# Patient Record
Sex: FEMALE | Race: WHITE | HISPANIC OR LATINO | Employment: UNEMPLOYED | URBAN - METROPOLITAN AREA
[De-identification: names, ages, dates, MRNs, and addresses within clinical notes are randomized per-mention and may not be internally consistent; named-entity substitution may affect disease eponyms.]

---

## 2020-10-19 DIAGNOSIS — M25.562 LEFT KNEE PAIN, UNSPECIFIED CHRONICITY: Primary | ICD-10-CM

## 2020-11-02 ENCOUNTER — HOSPITAL ENCOUNTER (OUTPATIENT)
Dept: RADIOLOGY | Facility: HOSPITAL | Age: 40
Discharge: HOME | End: 2020-11-02
Attending: ORTHOPAEDIC SURGERY
Payer: COMMERCIAL

## 2020-11-02 ENCOUNTER — TELEPHONE (OUTPATIENT)
Dept: ORTHOPEDICS | Facility: CLINIC | Age: 40
End: 2020-11-02

## 2020-11-02 ENCOUNTER — OFFICE VISIT (OUTPATIENT)
Dept: ORTHOPEDICS | Facility: CLINIC | Age: 40
End: 2020-11-02
Payer: COMMERCIAL

## 2020-11-02 VITALS — SYSTOLIC BLOOD PRESSURE: 117 MMHG | DIASTOLIC BLOOD PRESSURE: 77 MMHG | HEART RATE: 77 BPM | WEIGHT: 209 LBS

## 2020-11-02 DIAGNOSIS — M76.52 PATELLAR TENDINITIS OF LEFT KNEE: Primary | ICD-10-CM
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
## 2020-11-02 DIAGNOSIS — M25.562 LEFT KNEE PAIN, UNSPECIFIED CHRONICITY: Primary | ICD-10-CM

## 2020-11-02 DIAGNOSIS — M25.562 LEFT KNEE PAIN, UNSPECIFIED CHRONICITY: ICD-10-CM

## 2020-11-02 DIAGNOSIS — S80.02XA CONTUSION OF LEFT PATELLA, INITIAL ENCOUNTER: ICD-10-CM

## 2020-11-02 DIAGNOSIS — M70.52 PATELLAR BURSITIS OF LEFT KNEE: ICD-10-CM

## 2020-11-02 PROCEDURE — 73562 X-RAY EXAM OF KNEE 3: CPT | Mod: TC,RT

## 2020-11-02 PROCEDURE — 73590 XR TIBIA FIBULA 2 VIEW LEFT: ICD-10-PCS | Mod: 26,LT,, | Performed by: RADIOLOGY

## 2020-11-02 PROCEDURE — 99203 PR OFFICE/OUTPT VISIT, NEW, LEVL III, 30-44 MIN: ICD-10-PCS | Mod: S$GLB,,, | Performed by: ORTHOPAEDIC SURGERY

## 2020-11-02 PROCEDURE — 99999 PR PBB SHADOW E&M-EST. PATIENT-LVL II: CPT | Mod: PBBFAC,,, | Performed by: ORTHOPAEDIC SURGERY

## 2020-11-02 PROCEDURE — 73562 XR KNEE ORTHO LEFT WITH FLEXION: ICD-10-PCS | Mod: 26,RT,, | Performed by: RADIOLOGY

## 2020-11-02 PROCEDURE — 73590 X-RAY EXAM OF LOWER LEG: CPT | Mod: TC,LT

## 2020-11-02 PROCEDURE — 73562 X-RAY EXAM OF KNEE 3: CPT | Mod: 26,RT,, | Performed by: RADIOLOGY

## 2020-11-02 PROCEDURE — 99203 OFFICE O/P NEW LOW 30 MIN: CPT | Mod: S$GLB,,, | Performed by: ORTHOPAEDIC SURGERY

## 2020-11-02 PROCEDURE — 73564 X-RAY EXAM KNEE 4 OR MORE: CPT | Mod: 26,LT,, | Performed by: RADIOLOGY

## 2020-11-02 PROCEDURE — 99999 PR PBB SHADOW E&M-EST. PATIENT-LVL II: ICD-10-PCS | Mod: PBBFAC,,, | Performed by: ORTHOPAEDIC SURGERY

## 2020-11-02 PROCEDURE — 73590 X-RAY EXAM OF LOWER LEG: CPT | Mod: 26,LT,, | Performed by: RADIOLOGY

## 2020-11-02 PROCEDURE — 73564 XR KNEE ORTHO LEFT WITH FLEXION: ICD-10-PCS | Mod: 26,LT,, | Performed by: RADIOLOGY

## 2020-11-02 NOTE — PROGRESS NOTES
Patient ID: Trang Saeed  YOB: 1980  MRN: [id number]    Chief Complaint: Pain of the Left Knee    Referred By:     History of Present Illness: Trang Saeed is a right-hand dominant 40 y.o. 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  Patient states that on 9/3/2020 she was throwing out the trash and fell and hit her left knee on stairs. Workplace injury. Works for [employer] .    Knee Pain   The pain is present in the left knee. The current episode started more than 1 month ago. The injury was the result of a falling action while at work. The problem occurs constantly. The problem has been unchanged. Quality: twisting. The pain is at a severity of 8/10. Associated symptoms include joint swelling. Pertinent negatives include no fever or itching. The symptoms are aggravated by walking, sitting, lying down, activity and exercise. She has tried brace/corset, NSAIDs and OTC pain meds for the symptoms. The treatment provided mild relief. Physical therapy was effective and ineffective (Tried PT - unsure of where - helps in the moment).      Past Medical History:   History reviewed. No pertinent past medical history.  Past Surgical History:   Procedure Laterality Date    PERINEAL WOUND CLOSURE       Family History   Problem Relation Age of Onset    Breast cancer Mother     Diabetes Neg Hx     Hypertension Neg Hx     Ovarian cancer Neg Hx     Miscarriages / Stillbirths Neg Hx      Social History     Socioeconomic History    Marital status:      Spouse name: Not on file    Number of children: Not on file    Years of education: Not on file    Highest education level: Not on file   Occupational History    Not on file   Social Needs    Financial resource strain: Not on file    Food insecurity     Worry: Not on file     Inability: Not on file    Transportation needs     Medical: Not on file     Non-medical:  Not on file   Tobacco Use    Smoking status: Never Smoker   Substance and Sexual Activity    Alcohol use: No    Drug use: No    Sexual activity: Yes     Partners: Male   Lifestyle    Physical activity     Days per week: Not on file     Minutes per session: Not on file    Stress: Not on file   Relationships    Social connections     Talks on phone: Not on file     Gets together: Not on file     Attends Tenriism service: Not on file     Active member of club or organization: Not on file     Attends meetings of clubs or organizations: Not on file     Relationship status: Not on file   Other Topics Concern    Not on file   Social History Narrative    Not on file       Review of patient's allergies indicates:  No Known Allergies  Review of Systems   Constitution: Negative for fever.   HENT: Negative for sore throat.    Eyes: Negative for blurred vision.   Cardiovascular: Negative for dyspnea on exertion.   Respiratory: Negative for shortness of breath.    Hematologic/Lymphatic: Does not bruise/bleed easily.   Skin: Negative for itching.   Musculoskeletal: Positive for joint pain and joint swelling.   Gastrointestinal: Negative for vomiting.   Genitourinary: Negative for dysuria.   Neurological: Negative for dizziness.   Psychiatric/Behavioral: The patient does not have insomnia.        Physical Exam:   There is no height or weight on file to calculate BMI.  Vitals:    11/02/20 1123   BP: 117/77   Pulse: 77      GENERAL: Well appearing, appropriate for stated age, no acute distress.  CARDIOVASCULAR: Pulses regular by peripheral palpation.  PULMONARY: Respirations are even and non-labored.  NEURO: Awake, alert, and oriented x 3.  PSYCH: Mood & affect are appropriate.  HEENT: Head is normocephalic and atraumatic.  Ortho/SPM Exam  Right knee: Range of motion within normal limits and painless. Intact motor and sensation. Good perfusion. No tenderness, gross deformity, gross instability, or skin lesions.  Left knee:  prepatellar swelling. No erythema. No skin changes. Compartments soft and compressible. 0-100 active and passive. 5/5 flex ext. Calf soft non tender. Stable to v/v. No gross a/p instability. Intact EHL, FHL, gastrocsoleus, and tibialis anterior. Sensation intact to light touch in superficial peroneal, deep peroneal, tibial, sural, and saphenous nerve distributions. Foot warm and well perfused with capillary refill of less than 2 seconds and palpable pedal pulses.  Gait: slightly antalgic on left side.    Imaging:    X-ray Knee Ortho Left with Flexion  Narrative: EXAMINATION:  XR KNEE ORTHO LEFT WITH FLEXION    CLINICAL HISTORY:  . Pain in left knee    TECHNIQUE:  AP standing of both knees, PA flexion standing views of both knees, and Merchant views of both knees were performed. A lateral of the left knee was also performed.    COMPARISON:  None    FINDINGS:  There is no radiographic evidence of acute osseous, articular, or soft tissue abnormality.  Joint spaces are preserved.  Impression: No acute findings    Electronically signed by: Yonis Penn MD  Date:    11/02/2020  Time:    11:30    Relevant imaging results reviewed and interpreted by me, discussed with the patient and / or family today.     Other Tests:     No other tests performed today.    Assessment:  Trang Saeed is a 40 y.o. female with left knee pain   Left knee prepatellar bursitis   Left knee patellar tendonitis   Left knee patellar contusion   Work related injury    Encounter Diagnoses   Name Primary?    Patellar tendinitis of left knee Yes    Patellar bursitis of left knee     Contusion of left patella, initial encounter     Work related injury         Plan:   Knee sleeve - wear during the day only   PT 2x/wk - quad and hip strengthening, modalities as indicated   Voltaren cream BID prn   Avoid exacerbating activities   Light duty/seated work only   Left tib-fib x-rays on way out today    Follow-up: 6 weeks or sooner  if there are any problems between now and then.    Disclaimer: This note was prepared using a voice recognition system and is likely to have sound alike errors within the text.

## 2020-11-02 NOTE — PATIENT INSTRUCTIONS
Assessment:  Trang Saeed is a 40 y.o. female with left knee pain   Left knee prepatellar bursitis   Left knee patellar tendonitis   Left knee patellar contusion   Work related injury    Encounter Diagnoses   Name Primary?    Patellar tendinitis of left knee Yes    Patellar bursitis of left knee     Contusion of left patella, initial encounter     Work related injury         Plan:   Knee sleeve - wear during the day only   PT 2x/wk - quad and hip strengthening, modalities as indicated   Voltaren cream BID prn   Avoid exacerbating activities   Light duty/seated work only   Left tib-fib x-rays on way out today    Follow-up: 6 weeks or sooner if there are any problems between now and then.    Thank you for choosing [facility] and Dr. Nicolas Zuleta for your orthopedic & sports medicine care. It is our goal to provide you with exceptional care that will help keep you healthy, active, and get you back in the game.    If you felt that you received exemplary care today, please consider leaving us feedback on [employer] at [email or web address].     Please do not hesitate to reach out to us via email, phone, or MyChart with any questions, concerns, or feedback.    If you are experiencing pain/discomfort ,or have questions after 5pm and would like to be connected to the [facility] on-call team, please call this number and specify which Sports Medicine provider is treating you: [phone]

## 2020-11-16 ENCOUNTER — CLINICAL SUPPORT (OUTPATIENT)
Dept: REHABILITATION | Facility: HOSPITAL | Age: 40
End: 2020-11-16
Attending: ORTHOPAEDIC SURGERY
Payer: COMMERCIAL

## 2020-11-16 DIAGNOSIS — M76.52 PATELLAR TENDINITIS OF LEFT KNEE: ICD-10-CM

## 2020-11-16 DIAGNOSIS — M25.562 ACUTE PAIN OF LEFT KNEE: ICD-10-CM

## 2020-11-16 PROCEDURE — 97110 THERAPEUTIC EXERCISES: CPT

## 2020-11-16 PROCEDURE — 97161 PT EVAL LOW COMPLEX 20 MIN: CPT

## 2020-11-16 NOTE — PLAN OF CARE
[facility]  Physical Therapy Initial Evaluation    Name: Trang Saeed  Clinic Number: [id number]    Therapy Diagnosis:   Encounter Diagnosis   Name Primary?    Patellar tendinitis of left knee      Physician: Nicolas Zuleta MD    Physician Orders: PT Eval and Treat   Medical Diagnosis from Referral: Patellar tendinitis of left knee  Evaluation Date: 11/16/2020  Authorization Period Expiration: 11/02/2021  Plan of Care Expiration: 12/28/2020  Visit # / Visits authorized: 1/ 12    Time In: 11:20 am   Time Out: 12:20 pm   Total Appointment Time (timed & untimed codes): 20 minutes    Precautions: Standard    Subjective   Date of injury: September 3rd  History of current condition - Trang reports:Via an English to [place] interpretor over the phone. On September the 3rd [id number] she fell at work on to her left knee while going up the stairs She works as a  at a [employer]. Was prescribed pain medication by her MD, that only helps a little. Pain is worse in the morning up to a 10/10. Her pain is made worse with static standing, sitting and active movements. Her pain is described as sharp that is intermittent. Her pain is alleviated when laying down, medication, or when her  performs passive range of motion movements.     Occupation (including job description if provided):  at a [employer].  Job Demands: Cleaning.Put away boxes, put away garbage. Put away food. Sweep the building, sweep the parking lot. Up and down stairs while carrying various items that weight around 20 lbs.     Prior Medical Treatment: Medication and Imaging  Current Work Restrictions: Unable to work.     Medical History:   No past medical history on file.    Surgical History:   Trang Saeed  has a past surgical history that includes Perineal wound closure.    Medications:   Trang currently has no medications in their medication list.    Allergies: none      Imaging, X-rays: Left knee, left tibia fibula: No acute findings    Social History:  lives with their family    Pain:  Current 8/10, worst 10/10, best 5/10   Location: Left anterior knee  down to anterior shin.   Description: Sharp  Aggravating Factors: Stairs, standing up, Sitting.   Alleviating Factors: medication, laying down, Passive range of motion    Pt's goals: Return to gainful employment         Decrease pain.     Objective     Gait: Slow, labored. Increased LETY. Decreased stride length on L.     Squat: Double leg: unable to squat past 20 degrees of knee flexion.    Single leg: unable to test due to balance    Balance: eyes open SL balance on R: 5 seconds on L: 0 seconds.     Reflex/Sensation: Intact    Knee AROM:     (L) (R)     Flexion   124 WNL     Extension  8 WNL  Knee PROM  Flexion   128 WNL     Extension  5 p! WNL       Strength:  Hip flexors  4-/5 4/5  Quadriceps  3+/5 p! 4/5      Hamstrings  3+/5 4/5     Anterior Tibialis 4/5 4/5     Peroneals  4/5 4/5     Gastrocnemius 3+/5 3+/5     Adductors  4/5 4+/5     External rotators 3+/5 4/5     Gluteus Medius 3+/5 3+/5     Gluteus Pb 3+/5 4-/5    Joint Mobility: Slightly hypermobile tibiofemoral joint L>R.     Muscle Length:  Hamstrings: normal lenght     Bart: limited hip flexor length L>R  Tenderness to palpation: Highly tender to palpation along joint line of L knee    Special Test:  Anterior Drawer Painful  Posterior Drawer Painful     Lachman  Painful      Valgus stress  Painful Varus stress  Negative     hSerry  painful     Apley Compress Negative Apley Distract  Negative       Lower Limb Tension Test:  negative      Functional Job Specific Testing:     Job Specific Task Job Demands Current Ability Deficit? (Yes or No)   1. Moving items Climbing stairs Unable to  Yes   2. Sweeping Standing endurance Unable to stand > 2 mins w/o pain Yes   3. Carrying boxes Lifting and carrying up to 20# items Unable Yes   (Testing should not be performed  beyond that of the patient's job demands.)         TREATMENT   Treatment Time In: 12:00 pm  Treatment Time Out: 12:20 pm   Total Treatment time (time-based codes) separate from Evaluation: 20 minutes    Trang received therapeutic exercises to develop strength, endurance and ROM for 20 minutes including:  Quad sets 5' isometric holds 2 x 10   Hamstring sets 5' isometric holds 2 x 10   Seated calf raises 2 x 10  Seated toe raises 2 x 10  Clam shells 1 x 10      Home Exercises and Patient Education Provided    Education provided: Yes  - HEP, nature of symptoms, POC    Written Home Exercises Provided: yes.  Exercises were reviewed and Trang was able to demonstrate them prior to the end of the session.  Trang demonstrated good  understanding of the education provided.     See EMR under Patient Instructions for exercises provided 11/16/2020.    Assessment   Trang is a 40 y.o. female referred to outpatient Physical Therapy with a medical diagnosis of Patellar tendinitis of left knee. Pt presents with impaired LE range of motion, strength, gait, standing tolerance, and pain up to a 10/10 limiting her ability to perform ADLs and perform her work related duties. She presents somewhat consistently with the medical diagnosis of Patellar tendinitis but with patella bursitis and tibiofemoral joint laxity noted.   The patient's current job specific task deficits include the following: Unable to lift and mack boxes, ascend and descend box, and cleaning (sweeping).    Pt prognosis is Good.     Skilled Physical Therapy intervention is required at this time for the injured worker to address the musculoskeletal limitations and work-related functional deficits for their job as a .    Plan of care discussed with patient: Yes  Pt's spiritual, cultural and educational needs considered and patient is agreeable to the plan of care and goals as stated below:     Anticipated Barriers for therapy: language    Medical Necessity is  demonstrated by the following  History  Co-morbidities and personal factors that may impact the plan of care Co-morbidities:   high BMI    Personal Factors:   education level     low   Examination  Body Structures and Functions, activity limitations and participation restrictions that may impact the plan of care Body Regions:   lower extremities    Body Systems:    ROM  strength  gross coordinated movement  balance  gait  motor control  motor learning    Participation Restrictions:   Work duties    Activity limitations:   Learning and applying knowledge  watching  listening    General Tasks and Commands  no deficits    Communication  communicating with/receiving spoken language  communicating with/receiving non-verbal language    Mobility  lifting and carrying objects  walking    Self care  no deficits    Domestic Life  shopping  cooking  doing house work (cleaning house, washing dishes, laundry)    Interactions/Relationships  no deficits    Life Areas  informal education  employment    Community and Social Life  community life  recreation and leisure         Moderate   Clinical Presentation stable and uncomplicated low   Decision Making/ Complexity Score: low       Goals:     Short Term Goals: In 4 weeks:  1.I with HEP  2.Patient to demo increased AROM /PROM  to 90% WNLs  3.Patient to demo increase LE strength  to 4/5 grossly.   4.Patient to have decreased pain to 5/10 at all times.    Long Term Goals: In 6 weeks  1. Patient to perform daily activities including Work duties without limitation.  2. Patient to demonstrate increased knee AROM/PROM to 100% WNLs.  3. Patient to demonstrate increased LE strength to 4+/5 grossly.  4. Patient to have decreased pain to 3/10.    Pt will return to work full duty, full time.    Plan   Plan of care Certification: 11/16/2020 to 12/28/2020.    Outpatient Physical Therapy 2 times weekly for 6 weeks to include the following interventions: Gait Training, Manual Therapy, Moist Heat/  Ice, Neuromuscular Re-ed, Patient Education, Therapeutic Activites and Therapeutic Exercise.     Upon discharge from further skilled PT intervention it will determined if the need for a work conditioning program or Functional Capacity Evaluation is required to allow the injured worker to return to work with full potential achieved, continued improvement with body mechanics with advanced functional activities, and further prevention of future work-related injuries.     Shaun Hernández, [facility]  11/16/2020

## 2020-11-17 PROBLEM — M25.562 ACUTE PAIN OF LEFT KNEE: Status: ACTIVE | Noted: 2020-11-17

## 2020-11-27 ENCOUNTER — CLINICAL SUPPORT (OUTPATIENT)
Dept: REHABILITATION | Facility: HOSPITAL | Age: 40
End: 2020-11-27
Payer: COMMERCIAL

## 2020-11-27 DIAGNOSIS — M25.562 ACUTE PAIN OF LEFT KNEE: ICD-10-CM

## 2020-11-27 PROCEDURE — 97140 MANUAL THERAPY 1/> REGIONS: CPT

## 2020-11-27 PROCEDURE — 97110 THERAPEUTIC EXERCISES: CPT

## 2020-11-27 NOTE — PROGRESS NOTES
Physical Therapy Treatment Note     Name: Trang Saeed  Clinic Number: [id number]    Therapy Diagnosis:   Encounter Diagnosis   Name Primary?    Acute pain of left knee      Physician: Nicolas Zuleta MD    Visit Date: 11/27/2020    Physician Orders: PT Eval and Treat   Medical Diagnosis from Referral: Patellar tendinitis of left knee  Evaluation Date: 11/16/2020  Authorization Period Expiration: 11/02/2021  Plan of Care Expiration: 12/28/2020  Visit # / Visits authorized: 2/ 12      Time In: 11:23 am   Time Out 12:15 pm   Total Billable Time: 37 minutes    Precautions: Standard    Subjective     Pt reports: Her pain levels are the same. Her pain only decreases when performing the exercises. When she walks it hurts, and her knee feels like its going to give out.   She was compliant with home exercise program.  Response to previous treatment: good  Functional change: None    Pain: 8/10 pre treatment. 5/10 post treatment.   Location: left knee      Objective     Trang received therapeutic exercises to develop strength, endurance, ROM and flexibility for 25 minutes including:  Quad sets 5' isometric holds 2 x 10   Hamstring sets 5' isometric holds 2 x 10   Seated calf raises 2 x 10  Seated toe raises 2 x 10  Hip Adduction Isometrics  Glute sets   Clam shells 1 x 10- caused pain       Trang received the following manual therapy techniques: Joint mobilizations and Soft tissue Mobilization were applied to the: L knee for 12 minutes, including:  Tibiofemoral distractions grades I-II   STM medial knee  Patellofemoral cephalic and caudual mobilizations grades I-III    Trang received cold pack for 15 minutes to L knee.      Home Exercises Provided and Patient Education Provided     Education provided:   - POC, nature of symptoms,     Written Home Exercises Provided: yes.  Exercises were reviewed and Trang was able to demonstrate them prior to the end of the session.  Trang demonstrated fair   understanding of the education provided.     See EMR under N/A for exercises provided 11/27/2020.    Assessment   Trang returns to Pt with no acute changes in her presentation. She continues to present with hypersensitivity to touch and high irritability levels. She continues to guard passive LE movement when performed by the therapist. She tolerated exercises well today, but clamshells increased her pain levels and so did manual therapy occasionally. Her pain levels dropped from an 8/10 to 5/10 by the end of the session.     Trang is progressing well towards her goals.   Pt prognosis is Fair.     Pt will continue to benefit from skilled outpatient physical therapy to address the deficits listed in the problem list box on initial evaluation, provide pt/family education and to maximize pt's level of independence in the home and community environment.     Pt's spiritual, cultural and educational needs considered and pt agreeable to plan of care and goals.     Anticipated barriers to physical therapy: Language    Goals:   Short Term Goals: In 4 weeks:  1.I with HEP- not met  2.Patient to demo increased AROM /PROM  to 90% WNLs not met  3.Patient to demo increase LE strength  to 4/5 grossly.  not met  4.Patient to have decreased pain to 5/10 at all times. not met     Long Term Goals: In 6 weeks  1. Patient to perform daily activities including Work duties without limitation. not met  2. Patient to demonstrate increased knee AROM/PROM to 100% WNLs. not met  3. Patient to demonstrate increased LE strength to 4+/5 grossly. not met  4. Patient to have decreased pain to 3/10. not met    Plan     Plan of care Certification: 11/16/2020 to 12/28/2020.     Outpatient Physical Therapy 2 times weekly for 6 weeks to include the following interventions: Gait Training, Manual Therapy, Moist Heat/ Ice, Neuromuscular Re-ed, Patient Education, Therapeutic Activites and Therapeutic Exercise.      Upon discharge from further skilled PT  intervention it will determined if the need for a work conditioning program or Functional Capacity Evaluation is required to allow the injured worker to return to work with full potential achieved, continued improvement with body mechanics with advanced functional activities, and further prevention of future work-related injuries.          Shaun Hernández, SPT

## 2020-11-30 ENCOUNTER — CLINICAL SUPPORT (OUTPATIENT)
Dept: REHABILITATION | Facility: HOSPITAL | Age: 40
End: 2020-11-30
Payer: COMMERCIAL

## 2020-11-30 DIAGNOSIS — M25.562 ACUTE PAIN OF LEFT KNEE: ICD-10-CM

## 2020-11-30 PROCEDURE — 97140 MANUAL THERAPY 1/> REGIONS: CPT

## 2020-11-30 PROCEDURE — 97110 THERAPEUTIC EXERCISES: CPT

## 2020-11-30 NOTE — PROGRESS NOTES
Physical Therapy Treatment Note     Name: Trang Saeed  Clinic Number: [id number]    Therapy Diagnosis:   Encounter Diagnosis   Name Primary?    Acute pain of left knee      Physician: Nicolas Zuleta MD    Visit Date: 11/30/2020    Physician Orders: PT Eval and Treat   Medical Diagnosis from Referral: Patellar tendinitis of left knee  Evaluation Date: 11/16/2020  Authorization Period Expiration: 11/02/2021  Plan of Care Expiration: 12/28/2020  Visit # / Visits authorized: 3/ 12      Time In: 01:35 pm   Time Out 2:30 pm   Total Billable Time: 45 minutes    Precautions: Standard    Subjective     Pt reports: Via interpretor- Sometimes it feels good but sometimes it still hurts. The pain travels down her anterior shin and is worse when.   She was compliant with home exercise program.  Response to previous treatment: good  Functional change: None    Pain: 6/10 pre treatment. 5/10 post treatment.   Location: left knee      Objective     Trang received therapeutic exercises to develop strength, endurance, ROM and flexibility for 35 minutes including:  Quad sets 5' isometric holds 2 x 10   SAQ w/ 3 second hold 2x 20   SLRs  X 10   Hip Adduction Isometrics  Glute sets   Heel slides x 2 mins  Shuttle 2 x 1 min 3B  Hamstring sets 5' isometric holds 2 x 10   Bridges 2 x 10   Step ups 2 x 10     Not  Today:  Seated calf raises 2 x 10  Seated toe raises 2 x 10  Clam shells 1 x 10- caused pain       Trang received the following manual therapy techniques: Joint mobilizations and Soft tissue Mobilization were applied to the: L knee for 10 minutes, including:  Tibiofemoral distractions grades I-II   STM medial knee  Patellofemoral cephalic and caudual mobilizations grades I-III    Trang received cold pack for 10 minutes to L knee.      Home Exercises Provided and Patient Education Provided     Education provided:   - POC, nature of symptoms,     Written Home Exercises Provided: yes.  Exercises were reviewed and  Trang was able to demonstrate them prior to the end of the session.  Trang demonstrated fair  understanding of the education provided.     See EMR under N/A for exercises provided 11/27/2020.    Assessment   Trang returns to Pt with reports of slightly decreased pain levels and decreased frequency of pain. She continues to present with hypersensitivity to touch. Her guarding during passive motion has decreased noticeably. She tolerated exercises well today, but step ups were painful.     Trang is progressing well towards her goals.   Pt prognosis is Fair.     Pt will continue to benefit from skilled outpatient physical therapy to address the deficits listed in the problem list box on initial evaluation, provide pt/family education and to maximize pt's level of independence in the home and community environment.     Pt's spiritual, cultural and educational needs considered and pt agreeable to plan of care and goals.     Anticipated barriers to physical therapy: Language    Goals:   Short Term Goals: In 4 weeks:  1.I with HEP- not met  2.Patient to demo increased AROM /PROM  to 90% WNLs not met  3.Patient to demo increase LE strength  to 4/5 grossly.  not met  4.Patient to have decreased pain to 5/10 at all times. not met     Long Term Goals: In 6 weeks  1. Patient to perform daily activities including Work duties without limitation. not met  2. Patient to demonstrate increased knee AROM/PROM to 100% WNLs. not met  3. Patient to demonstrate increased LE strength to 4+/5 grossly. not met  4. Patient to have decreased pain to 3/10. not met    Plan     Plan of care Certification: 11/16/2020 to 12/28/2020.     Outpatient Physical Therapy 2 times weekly for 6 weeks to include the following interventions: Gait Training, Manual Therapy, Moist Heat/ Ice, Neuromuscular Re-ed, Patient Education, Therapeutic Activites and Therapeutic Exercise.      Upon discharge from further skilled PT intervention it will determined if the  need for a work conditioning program or Functional Capacity Evaluation is required to allow the injured worker to return to work with full potential achieved, continued improvement with body mechanics with advanced functional activities, and further prevention of future work-related injuries.        Shaun Gagnon, REJI Nuñez, PT

## 2020-12-02 ENCOUNTER — CLINICAL SUPPORT (OUTPATIENT)
Dept: REHABILITATION | Facility: HOSPITAL | Age: 40
End: 2020-12-02
Payer: COMMERCIAL

## 2020-12-02 DIAGNOSIS — M25.562 ACUTE PAIN OF LEFT KNEE: ICD-10-CM

## 2020-12-02 PROCEDURE — 97140 MANUAL THERAPY 1/> REGIONS: CPT

## 2020-12-02 PROCEDURE — 97110 THERAPEUTIC EXERCISES: CPT

## 2020-12-02 NOTE — PROGRESS NOTES
Physical Therapy Treatment Note     Name: Trang Saeed  Clinic Number: [id number]    Therapy Diagnosis:   Encounter Diagnosis   Name Primary?    Acute pain of left knee      Physician: Nicolas Zuleta MD    Visit Date: 12/2/2020    Physician Orders: PT Eval and Treat   Medical Diagnosis from Referral: Patellar tendinitis of left knee  Evaluation Date: 11/16/2020  Authorization Period Expiration: 11/02/2021  Plan of Care Expiration: 12/28/2020  Visit # / Visits authorized: 4/ 12      Time In: 01:45 pm   Time Out  02:30 pm pm   Total Billable Time: 45 minutes    Precautions: Standard    Subjective     Pt reports: Via interpretor- Her knee is starting to feel better but it hurts while doing the home exercises and when going up stairs.   She was compliant with home exercise program.  Response to previous treatment: good  Functional change: None    Pain: 6/10 pre treatment. 4/10 post treatment.   Location: left knee      Objective     Trang received therapeutic exercises to develop strength, endurance, ROM and flexibility for 30 minutes including:  Nu-step x7 mins  Quad sets 5' isometric holds 2 x 10   SAQ w/ 3 second hold 2x 20   SLRs  X 10   Hip Adduction Isometrics  Glute sets   Isometric 45 deg extension holds 2/ 2# weight. 45 seconds hold, 30 second rest.   Hamstring sets 5' isometric holds 2 x 10   Bridges 2 x 10     Not  Today:  Heel slides x 2 mins  Seated calf raises 2 x 10  Seated toe raises 2 x 10  Clam shells 1 x 10- caused pain   Shuttle 2 x 1 min 3B  Step ups 2 x 10        Trang received the following manual therapy techniques: Joint mobilizations and Soft tissue Mobilization were applied to the: L knee for 15 minutes, including:  Tibiofemoral distractions grades I-II   STM medial knee  Patellofemoral caudual mobilizations grades I-III    Trang received cold pack for 0 minutes to L knee.    Trang received Catalan taping on her L knee to promote knee stability and shift lateral tilt  of her patella.     Home Exercises Provided and Patient Education Provided     Education provided:   - POC, nature of symptoms,     Written Home Exercises Provided: yes.  Exercises were reviewed and Trang was able to demonstrate them prior to the end of the session.  Trang demonstrated fair  understanding of the education provided.     See EMR under N/A for exercises provided 11/27/2020.    Assessment   Trang returns to Pt with reports of slightly decreased pain levels and decreased frequency of pain. No longer shows any guarding of passive movements. She tolerated exercises well today, but she still has intermittent pain with movement. The Catalan Taping seemed to provide increased knee stability while walking.     Trang is progressing well towards her goals.   Pt prognosis is Fair.     Pt will continue to benefit from skilled outpatient physical therapy to address the deficits listed in the problem list box on initial evaluation, provide pt/family education and to maximize pt's level of independence in the home and community environment.     Pt's spiritual, cultural and educational needs considered and pt agreeable to plan of care and goals.     Anticipated barriers to physical therapy: Language    Goals:   Short Term Goals: In 4 weeks:  1.I with HEP- not met  2.Patient to demo increased AROM /PROM  to 90% WNLs not met  3.Patient to demo increase LE strength  to 4/5 grossly.  not met  4.Patient to have decreased pain to 5/10 at all times. not met     Long Term Goals: In 6 weeks  1. Patient to perform daily activities including Work duties without limitation. not met  2. Patient to demonstrate increased knee AROM/PROM to 100% WNLs. not met  3. Patient to demonstrate increased LE strength to 4+/5 grossly. not met  4. Patient to have decreased pain to 3/10. not met    Plan     Plan of care Certification: 11/16/2020 to 12/28/2020.     Outpatient Physical Therapy 2 times weekly for 6 weeks to include the  following interventions: Gait Training, Manual Therapy, Moist Heat/ Ice, Neuromuscular Re-ed, Patient Education, Therapeutic Activites and Therapeutic Exercise.      Upon discharge from further skilled PT intervention it will determined if the need for a work conditioning program or Functional Capacity Evaluation is required to allow the injured worker to return to work with full potential achieved, continued improvement with body mechanics with advanced functional activities, and further prevention of future work-related injuries.        Shaun Gagnon, SPT  Sangeetha Nuñez, PT

## 2020-12-07 ENCOUNTER — CLINICAL SUPPORT (OUTPATIENT)
Dept: REHABILITATION | Facility: HOSPITAL | Age: 40
End: 2020-12-07
Payer: COMMERCIAL

## 2020-12-07 DIAGNOSIS — M25.562 ACUTE PAIN OF LEFT KNEE: ICD-10-CM

## 2020-12-07 PROCEDURE — 97110 THERAPEUTIC EXERCISES: CPT | Mod: CQ

## 2020-12-07 NOTE — PROGRESS NOTES
Physical Therapy Assistant Treatment Note     Name: Trang Saeed  Clinic Number: [id number]    Therapy Diagnosis:   Encounter Diagnosis   Name Primary?    Acute pain of left knee      Physician: Nicolas Zuleta MD    Visit Date: 12/7/2020    Physician Orders: PT Eval and Treat   Medical Diagnosis from Referral: Patellar tendinitis of left knee  Evaluation Date: 11/16/2020  Authorization Period Expiration: 11/02/2021  Plan of Care Expiration: 12/28/2020  Visit # / Visits authorized: 5/ 12  PTA Visit 1    Time In: 01:35 pm   Time Out  02:20 pm   Total Billable Time: 45 minutes    Precautions: Standard    Subjective     Pt reports: Via interpretor- Therapy has been helping her knee. She hurts while doing exercises but feels better after. She continues to hurt with stairs and prolonged standing.  She was compliant with home exercise program.  Response to previous treatment: good   Functional change: pain relief    Pain: 5/10 pre treatment. 4/10 post treatment.   Location: left knee      Objective     Trang received therapeutic exercises to develop strength, endurance, ROM and flexibility for 30 minutes including:    Squats with UE support 2x10  Standing glut med 2x10  Quad sets 5' isometric holds 2 x 10   SAQ w/ 3 second hold 2x 20 2#  SLRs  X 5  Hip Adduction Isometrics  Isometric 45 deg extension holds 2/ 2# weight. 45 seconds hold, 30 second rest.   Bridges 2 x 10   Heel slides x 2 mins    Not  Today:  Seated calf raises 2 x 10  Seated toe raises 2 x 10  Clam shells 1 x 10- caused pain   Shuttle 2 x 1 min 3B  Step ups 2 x 10        Trang received the following manual therapy techniques: Joint mobilizations and Soft tissue Mobilization were applied to the: L knee for 10 minutes, including:  Tibiofemoral distractions grades I-II   STM medial knee  Patellofemoral caudual mobilizations grades I-III    Trang received cold pack for 10 minutes to L knee.    Trang received Alayna taping on her L knee  to promote knee stability and shift lateral tilt of her patella.     Home Exercises Provided and Patient Education Provided     Education provided:   - POC, nature of symptoms    Written Home Exercises Provided: yes.  Exercises were reviewed and Trang was able to demonstrate them prior to the end of the session.  Trang demonstrated fair  understanding of the education provided.     See EMR under N/A for exercises provided 11/27/2020.    Assessment   Trang presents to therapy with increased pain in L knee worsening with stairs and SLR. Demonstrates improvement in symptoms by ability to perform additional weight bearing activities and good squat depth with UE support.    Trang is progressing well towards her goals.   Pt prognosis is Fair.     Pt will continue to benefit from skilled outpatient physical therapy to address the deficits listed in the problem list box on initial evaluation, provide pt/family education and to maximize pt's level of independence in the home and community environment.     Pt's spiritual, cultural and educational needs considered and pt agreeable to plan of care and goals.     Anticipated barriers to physical therapy: Language    Goals:    Short Term Goals: In 4 weeks:  1.I with HEP- not met  2.Patient to demo increased AROM /PROM  to 90% WNLs not met  3.Patient to demo increase LE strength  to 4/5 grossly.  not met  4.Patient to have decreased pain to 5/10 at all times. not met     Long Term Goals: In 6 weeks  1. Patient to perform daily activities including Work duties without limitation. not met  2. Patient to demonstrate increased knee AROM/PROM to 100% WNLs. not met  3. Patient to demonstrate increased LE strength to 4+/5 grossly. not met  4. Patient to have decreased pain to 3/10. not met    Plan     Plan of care Certification: 11/16/2020 to 12/28/2020.     Outpatient Physical Therapy 2 times weekly for 6 weeks to include the following interventions: Gait Training, Manual Therapy,  Moist Heat/ Ice, Neuromuscular Re-ed, Patient Education, Therapeutic Activites and Therapeutic Exercise.      Upon discharge from further skilled PT intervention it will determined if the need for a work conditioning program or Functional Capacity Evaluation is required to allow the injured worker to return to work with full potential achieved, continued improvement with body mechanics with advanced functional activities, and further prevention of future work-related injuries.      Jaquelin Dominguez, PTA

## 2020-12-08 ENCOUNTER — DOCUMENTATION ONLY (OUTPATIENT)
Dept: REHABILITATION | Facility: HOSPITAL | Age: 40
End: 2020-12-08

## 2020-12-08 NOTE — PROGRESS NOTES
PT/PTA met face to face to discuss pt's treatment plan and progress towards established goals. Pt will be seen by a physical therapist minimally every 6th visit or every 30 days.    Jaquelin Dominguez PTA

## 2020-12-09 ENCOUNTER — CLINICAL SUPPORT (OUTPATIENT)
Dept: REHABILITATION | Facility: HOSPITAL | Age: 40
End: 2020-12-09
Payer: COMMERCIAL

## 2020-12-09 DIAGNOSIS — M25.562 ACUTE PAIN OF LEFT KNEE: ICD-10-CM

## 2020-12-09 PROCEDURE — 97140 MANUAL THERAPY 1/> REGIONS: CPT

## 2020-12-09 PROCEDURE — 97110 THERAPEUTIC EXERCISES: CPT

## 2020-12-09 NOTE — PROGRESS NOTES
Physical Therapy Treatment Note     Name: Trang Springeranza  Clinic Number: [id number]    Therapy Diagnosis:   Encounter Diagnosis   Name Primary?    Acute pain of left knee      Physician: Nicolas Zuleta MD    Visit Date: 12/9/2020    Physician Orders: PT Eval and Treat   Medical Diagnosis from Referral: Patellar tendinitis of left knee  Evaluation Date: 11/16/2020  Authorization Period Expiration: 11/02/2021  Plan of Care Expiration: 12/28/2020  Visit # / Visits authorized: 6/ 12    Time In: 01:35 pm   Time Out  02:20 pm   Total Billable Time: 45 minutes    Precautions: Standard    Subjective     Pt reports: that she is feeling a little better. Still having the most difficulty with walking and stairs.   She was compliant with home exercise program.  Response to previous treatment: good   Functional change: pain relief    Pain: 5/10 pre treatment. 3/10 post treatment.   Location: left knee      Objective     Trang received therapeutic exercises to develop strength, endurance, ROM and flexibility for 35 minutes including:    Nustep x 5 min for joint nutrition  Shuttle 4 bands x 3 min  Standing heel raises  Standing glut med against TB  SLR  Sidelying hip AB  Prone hamstring curl- painful  Prone hip EX  Bridge- painful  Sit<>stands    Trang received the following manual therapy techniques: Joint mobilizations and Soft tissue Mobilization were applied to the: L knee for 10 minutes, including:  Tibiofemoral distractions grades I-II   STM medial knee  Patellofemoral caudual mobilizations grades I-III    Trang received cold pack for 10 minutes to L knee.    Home Exercises Provided and Patient Education Provided     Education provided:   - POC, nature of symptoms    Written Home Exercises Provided: yes.  Exercises were reviewed and Trang was able to demonstrate them prior to the end of the session.  Trang demonstrated fair  understanding of the education provided.     See EMR under N/A for exercises  provided 11/27/2020.    Assessment   Trang demonstrated improvement in tolerance to standing activities this session. Still having difficulty with bending knee and performing stairs without increase in symptoms.    Trang is progressing well towards her goals.   Pt prognosis is Fair.     Pt will continue to benefit from skilled outpatient physical therapy to address the deficits listed in the problem list box on initial evaluation, provide pt/family education and to maximize pt's level of independence in the home and community environment.     Pt's spiritual, cultural and educational needs considered and pt agreeable to plan of care and goals.     Anticipated barriers to physical therapy: Language    Goals:    Short Term Goals: In 4 weeks:  1.I with HEP- not met  2.Patient to demo increased AROM /PROM  to 90% WNLs not met  3.Patient to demo increase LE strength  to 4/5 grossly.  not met  4.Patient to have decreased pain to 5/10 at all times. not met     Long Term Goals: In 6 weeks  1. Patient to perform daily activities including Work duties without limitation. not met  2. Patient to demonstrate increased knee AROM/PROM to 100% WNLs. not met  3. Patient to demonstrate increased LE strength to 4+/5 grossly. not met  4. Patient to have decreased pain to 3/10. not met    Plan     Plan of care Certification: 11/16/2020 to 12/28/2020.     Outpatient Physical Therapy 2 times weekly for 6 weeks to include the following interventions: Gait Training, Manual Therapy, Moist Heat/ Ice, Neuromuscular Re-ed, Patient Education, Therapeutic Activites and Therapeutic Exercise.      Upon discharge from further skilled PT intervention it will determined if the need for a work conditioning program or Functional Capacity Evaluation is required to allow the injured worker to return to work with full potential achieved, continued improvement with body mechanics with advanced functional activities, and further prevention of future  work-related injuries.      Sangeetha Nuñez, PT

## 2020-12-14 ENCOUNTER — CLINICAL SUPPORT (OUTPATIENT)
Dept: REHABILITATION | Facility: HOSPITAL | Age: 40
End: 2020-12-14
Payer: COMMERCIAL

## 2020-12-14 DIAGNOSIS — M25.562 ACUTE PAIN OF LEFT KNEE: ICD-10-CM

## 2020-12-14 PROCEDURE — 97110 THERAPEUTIC EXERCISES: CPT

## 2020-12-14 NOTE — PROGRESS NOTES
Physical Therapy Treatment Note     Name: Trang Springeranza  Clinic Number: [id number]    Therapy Diagnosis:   Encounter Diagnosis   Name Primary?    Acute pain of left knee      Physician: Nicolas Zuleta MD    Visit Date: 12/14/2020    Physician Orders: PT Eval and Treat   Medical Diagnosis from Referral: Patellar tendinitis of left knee  Evaluation Date: 11/16/2020  Authorization Period Expiration: 11/02/2021  Plan of Care Expiration: 12/28/2020  Visit # / Visits authorized: 7/ 12    Time In: 01:35 pm   Time Out  02:30 pm   Total Billable Time: 45 minutes    Precautions: Standard    Subjective     Pt reports:  That she had some pain with cleaning yesterday. Overall thinks her knee is getting better.  She was compliant with home exercise program.  Response to previous treatment: good   Functional change: pain relief    Pain: 5/10 pre treatment. 3/10 post treatment.   Location: left knee      Objective     Trang received therapeutic exercises to develop strength, endurance, ROM and flexibility for 40 minutes including:    Nustep x 5 min for joint nutrition  Shuttle 6 bands x 3 min  Standing heel raises  Standing glut med against TB  SLR  Sidelying hip AB  Prone hamstring curl  Prone hip EX  Stairs  Standing weight shift    Trang received the following manual therapy techniques: Joint mobilizations and Soft tissue Mobilization were applied to the: L knee for 5 minutes, including:  Tibiofemoral distractions grades I-II   STM medial knee  Patellofemoral caudual mobilizations grades I-III    Trang received cold pack for 10 minutes to L knee.    Home Exercises Provided and Patient Education Provided     Education provided:   - POC, nature of symptoms    Written Home Exercises Provided: yes.  Exercises were reviewed and Trang was able to demonstrate them prior to the end of the session.  Trang demonstrated fair  understanding of the education provided.     See EMR under N/A for exercises provided  11/27/2020.    Assessment   Patient is continuing to shoe improvement in tolerance to standing activities each visit. Still difficulty with stairs and any single stance activity, but improving each week.    Trang is progressing well towards her goals.   Pt prognosis is Fair.     Pt will continue to benefit from skilled outpatient physical therapy to address the deficits listed in the problem list box on initial evaluation, provide pt/family education and to maximize pt's level of independence in the home and community environment.     Pt's spiritual, cultural and educational needs considered and pt agreeable to plan of care and goals.     Anticipated barriers to physical therapy: Language    Goals:    Short Term Goals: In 4 weeks:  1.I with HEP- not met  2.Patient to demo increased AROM /PROM  to 90% WNLs not met  3.Patient to demo increase LE strength  to 4/5 grossly.  not met  4.Patient to have decreased pain to 5/10 at all times. not met     Long Term Goals: In 6 weeks  1. Patient to perform daily activities including Work duties without limitation. not met  2. Patient to demonstrate increased knee AROM/PROM to 100% WNLs. not met  3. Patient to demonstrate increased LE strength to 4+/5 grossly. not met  4. Patient to have decreased pain to 3/10. not met    Plan     Plan of care Certification: 11/16/2020 to 12/28/2020.     Outpatient Physical Therapy 2 times weekly for 6 weeks to include the following interventions: Gait Training, Manual Therapy, Moist Heat/ Ice, Neuromuscular Re-ed, Patient Education, Therapeutic Activites and Therapeutic Exercise.      Upon discharge from further skilled PT intervention it will determined if the need for a work conditioning program or Functional Capacity Evaluation is required to allow the injured worker to return to work with full potential achieved, continued improvement with body mechanics with advanced functional activities, and further prevention of future work-related  injuries.      Sangeetha Nuñez, PT

## 2020-12-16 ENCOUNTER — CLINICAL SUPPORT (OUTPATIENT)
Dept: REHABILITATION | Facility: HOSPITAL | Age: 40
End: 2020-12-16
Payer: COMMERCIAL

## 2020-12-16 DIAGNOSIS — M25.562 ACUTE PAIN OF LEFT KNEE: ICD-10-CM

## 2020-12-16 PROCEDURE — 97110 THERAPEUTIC EXERCISES: CPT

## 2020-12-17 NOTE — PROGRESS NOTES
Physical Therapy Treatment Note     Name: Trang Springeranza  Clinic Number: [id number]    Therapy Diagnosis:   Encounter Diagnosis   Name Primary?    Acute pain of left knee      Physician: Nicolas Zuleta MD    Visit Date: 12/16/2020    Physician Orders: PT Eval and Treat   Medical Diagnosis from Referral: Patellar tendinitis of left knee  Evaluation Date: 11/16/2020  Authorization Period Expiration: 11/02/2021  Plan of Care Expiration: 12/28/2020  Visit # / Visits authorized: 8/ 12    Time In: 01:35 pm   Time Out  02:30 pm   Total Billable Time: 45 minutes    Precautions: Standard    Subjective     Pt reports:  That she is still having pain with stairs, but overall feels better.  She was compliant with home exercise program.  Response to previous treatment: good   Functional change: pain relief, improvement in walking    Pain: 3/10   Location: left knee      Objective     Trang received therapeutic exercises to develop strength, endurance, ROM and flexibility for 40 minutes including:    Nustep x 7 min for joint nutrition  Shuttle 6 bands x 3 min  Shuttl SL 4 bands x 2 min  Standing heel raises  Standing glut med against TB  SLR  Sidelying hip AB  Prone hamstring curl  Prone hip EX  Small step ups and over    Trang received the following manual therapy techniques: Joint mobilizations and Soft tissue Mobilization were applied to the: L knee for 5 minutes, including:  Tibiofemoral distractions grades I-II   STM medial knee  Patellofemoral caudual mobilizations grades I-III    Trang received cold pack for 10 minutes to L knee.    Home Exercises Provided and Patient Education Provided     Education provided:   - POC, nature of symptoms    Written Home Exercises Provided: yes.  Exercises were reviewed and Trang was able to demonstrate them prior to the end of the session.  Trang demonstrated fair  understanding of the education provided.     See EMR under N/A for exercises provided  11/27/2020.    Assessment   Patient is continuing to shoe improvement in tolerance to standing activities each visit. Still difficulty with stairs and increased weight on the L side, but is improving each visit.     Trang is progressing well towards her goals.   Pt prognosis is Fair.     Pt will continue to benefit from skilled outpatient physical therapy to address the deficits listed in the problem list box on initial evaluation, provide pt/family education and to maximize pt's level of independence in the home and community environment.     Pt's spiritual, cultural and educational needs considered and pt agreeable to plan of care and goals.     Anticipated barriers to physical therapy: Language    Goals:    Short Term Goals: In 4 weeks:  1.I with HEP- not met  2.Patient to demo increased AROM /PROM  to 90% WNLs not met  3.Patient to demo increase LE strength  to 4/5 grossly.  not met  4.Patient to have decreased pain to 5/10 at all times. not met     Long Term Goals: In 6 weeks  1. Patient to perform daily activities including Work duties without limitation. not met  2. Patient to demonstrate increased knee AROM/PROM to 100% WNLs. not met  3. Patient to demonstrate increased LE strength to 4+/5 grossly. not met  4. Patient to have decreased pain to 3/10. not met    Plan     Plan of care Certification: 11/16/2020 to 12/28/2020.     Outpatient Physical Therapy 2 times weekly for 6 weeks to include the following interventions: Gait Training, Manual Therapy, Moist Heat/ Ice, Neuromuscular Re-ed, Patient Education, Therapeutic Activites and Therapeutic Exercise.      Upon discharge from further skilled PT intervention it will determined if the need for a work conditioning program or Functional Capacity Evaluation is required to allow the injured worker to return to work with full potential achieved, continued improvement with body mechanics with advanced functional activities, and further prevention of future  work-related injuries.      Sangeetha Nuñez, PT

## 2020-12-21 ENCOUNTER — CLINICAL SUPPORT (OUTPATIENT)
Dept: REHABILITATION | Facility: HOSPITAL | Age: 40
End: 2020-12-21
Payer: COMMERCIAL

## 2020-12-21 DIAGNOSIS — M25.562 ACUTE PAIN OF LEFT KNEE: ICD-10-CM

## 2020-12-21 PROCEDURE — 97110 THERAPEUTIC EXERCISES: CPT

## 2020-12-21 NOTE — PROGRESS NOTES
Physical Therapy Treatment Note     Name: Trang Springeranza  Clinic Number: [id number]    Therapy Diagnosis:   Encounter Diagnosis   Name Primary?    Acute pain of left knee      Physician: Nicolas Zuleta MD    Visit Date: 12/21/2020    Physician Orders: PT Eval and Treat   Medical Diagnosis from Referral: Patellar tendinitis of left knee  Evaluation Date: 11/16/2020  Authorization Period Expiration: 11/02/2021  Plan of Care Expiration: 12/28/2020  Visit # / Visits authorized: 9/ 12    Time In: 01:35 pm   Time Out  02:30 pm   Total Billable Time: 45 minutes    Precautions: Standard    Subjective     Pt reports:  That she is feeling better, but her MD visit got postponed.   She was compliant with home exercise program.  Response to previous treatment: good   Functional change: pain relief, improvement in walking    Pain: 3/10   Location: left knee      Objective     Trang received therapeutic exercises to develop strength, endurance, ROM and flexibility for 40 minutes including:    Nustep x 8 min for joint nutrition  Shuttle 6 bands x 3 min  Shuttl SL 4 bands x 2 min  Small step ups  Standing glut med against TB  SLR + hip AB/hip FL  Prone glut lift  Small step ups and over    Trang received the following manual therapy techniques: Joint mobilizations and Soft tissue Mobilization were applied to the: L knee for 5 minutes, including:  Tibiofemoral distractions grades I-II   STM medial knee  Patellofemoral caudual mobilizations grades I-III    Trang received cold pack for 10 minutes to L knee.    Home Exercises Provided and Patient Education Provided     Education provided:   - POC, nature of symptoms    Written Home Exercises Provided: yes.  Exercises were reviewed and Trang was able to demonstrate them prior to the end of the session.  Trang demonstrated fair  understanding of the education provided.     See EMR under N/A for exercises provided prior visit.    Assessment   Patient is continuing to  show improvement in tolerance to standing activities each visit. And today was the first day she could perform a small step up without any increase in symptoms!    Trang is progressing well towards her goals.   Pt prognosis is Fair.     Pt will continue to benefit from skilled outpatient physical therapy to address the deficits listed in the problem list box on initial evaluation, provide pt/family education and to maximize pt's level of independence in the home and community environment.     Pt's spiritual, cultural and educational needs considered and pt agreeable to plan of care and goals.     Anticipated barriers to physical therapy: Language    Goals:    Short Term Goals: In 4 weeks:  1.I with HEP- not met  2.Patient to demo increased AROM /PROM  to 90% WNLs not met  3.Patient to demo increase LE strength  to 4/5 grossly.  not met  4.Patient to have decreased pain to 5/10 at all times. not met     Long Term Goals: In 6 weeks  1. Patient to perform daily activities including Work duties without limitation. not met  2. Patient to demonstrate increased knee AROM/PROM to 100% WNLs. not met  3. Patient to demonstrate increased LE strength to 4+/5 grossly. not met  4. Patient to have decreased pain to 3/10. not met    Plan     Plan of care Certification: 11/16/2020 to 12/28/2020.     Outpatient Physical Therapy 2 times weekly for 6 weeks to include the following interventions: Gait Training, Manual Therapy, Moist Heat/ Ice, Neuromuscular Re-ed, Patient Education, Therapeutic Activites and Therapeutic Exercise.      Upon discharge from further skilled PT intervention it will determined if the need for a work conditioning program or Functional Capacity Evaluation is required to allow the injured worker to return to work with full potential achieved, continued improvement with body mechanics with advanced functional activities, and further prevention of future work-related injuries.      Sangeetha Nuñez, PT

## 2020-12-23 ENCOUNTER — OFFICE VISIT (OUTPATIENT)
Dept: ORTHOPEDICS | Facility: CLINIC | Age: 40
End: 2020-12-23

## 2020-12-23 VITALS — WEIGHT: 209 LBS

## 2020-12-23 DIAGNOSIS — M70.52 PATELLAR BURSITIS OF LEFT KNEE: ICD-10-CM

## 2020-12-23 DIAGNOSIS — S80.02XD CONTUSION OF LEFT PATELLA, SUBSEQUENT ENCOUNTER: ICD-10-CM
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
## 2020-12-23 DIAGNOSIS — M76.52 PATELLAR TENDINITIS OF LEFT KNEE: Primary | ICD-10-CM

## 2020-12-23 PROCEDURE — 99213 OFFICE O/P EST LOW 20 MIN: CPT | Mod: PBBFAC | Performed by: PHYSICIAN ASSISTANT

## 2020-12-23 PROCEDURE — 99213 PR OFFICE/OUTPT VISIT, EST, LEVL III, 20-29 MIN: ICD-10-PCS | Mod: S$PBB,,, | Performed by: PHYSICIAN ASSISTANT

## 2020-12-23 PROCEDURE — 99213 OFFICE O/P EST LOW 20 MIN: CPT | Mod: S$PBB,,, | Performed by: PHYSICIAN ASSISTANT

## 2020-12-23 PROCEDURE — 99999 PR PBB SHADOW E&M-EST. PATIENT-LVL III: CPT | Mod: PBBFAC,,, | Performed by: PHYSICIAN ASSISTANT

## 2020-12-23 PROCEDURE — 99999 PR PBB SHADOW E&M-EST. PATIENT-LVL III: ICD-10-PCS | Mod: PBBFAC,,, | Performed by: PHYSICIAN ASSISTANT

## 2020-12-23 NOTE — PATIENT INSTRUCTIONS
Assessment:  Trang Saeed is a 40 y.o. female presents today for a recheck on left knee pain after a work place injury on 9/3/20.     Encounter Diagnoses   Name Primary?    Patellar tendinitis of left knee Yes    Patellar bursitis of left knee     Contusion of left patella, initial encounter     Work related injury         Plan:  · Continue physical therapy with [facility] working on quad, glute, and hip strengthen.  · Work restrictions: Medium duty with frequent sitting breaks.   · Knee sleeve during the day  · Voltaren gel prn- over the counter  · Recheck in 8 weeks    Follow-up: 8 weeks or sooner if there are any problems between now and then.    Thank you for choosing [facility] and Dr. Nicolas Zuleta for your orthopedic & sports medicine care. It is our goal to provide you with exceptional care that will help keep you healthy, active, and get you back in the game.    If you felt that you received exemplary care today, please consider leaving us feedback on Healthgrades at [email or web address].     Please do not hesitate to reach out to us via email, phone, or MyChart with any questions, concerns, or feedback.    If you are experiencing pain/discomfort ,or have questions after 5pm and would like to be connected to the [facility] on-call team, please call this number and specify which Sports Medicine provider is treating you: [phone]

## 2020-12-23 NOTE — LETTER
December 23, 2020      [facility]  [id number]  [place] [address]  Phone: [phone]  Fax: [phone]       Patient: Trang Saeed   YOB: 1980  Date of Visit: 12/23/2020    To Whom It May Concern:    Jennifer Saeed  was at [facility] on 12/23/2020.May return work with restrictions: medium duty with frequent sitting breaks.  If you have any questions or concerns, or if I can be of further assistance, please do not hesitate to contact me.    Sincerely,    Stacy Sauceda PA-C

## 2020-12-23 NOTE — PROGRESS NOTES
Patient ID: Trang Saeed  YOB: 1980  MRN: [id number]    Chief Complaint: Pain of the Left Knee    Referred By:     Pashto speaking, video translation service used at todays visit.      History of Present Illness: Trang Saeed is a right-hand dominant 40 y.o. 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  Patient states that on 9/3/2020 she was throwing out the trash and fell and hit her left knee on stairs. Workplace injury. Works for [employer] . She states that her knee pain is 6/10 today. She states that she has severe pain when using stairs. She has been doing physical therapy at [facility] and has noticed improvement with physical therapy. She no longer has a constant pain in her knee but now only related to going up and down stairs. Currently she is not working due to her employer not having seated work for her. Has completed 8 sessions of physical therapy. Denies any fevers, chills, night sweats, numbness or tingling.     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  Patient states that on 9/3/2020 she was throwing out the trash and fell and hit her left knee on stairs. Workplace injury. Works for [employer] .  Knee Pain   The pain is present in the left knee. This is a chronic problem. The problem occurs constantly. The problem has been unchanged. The quality of the pain is described as aching, sharp and shooting. The pain is at a severity of 6/10. Associated symptoms include joint swelling, a limited range of motion and stiffness. Pertinent negatives include no fever or itching. The symptoms are aggravated by activity, bearing weight and walking. She has tried cold and NSAIDs for the symptoms. The treatment provided no relief.       Past Medical  History:   History reviewed. No pertinent past medical history.  Past Surgical History:   Procedure Laterality Date    PERINEAL WOUND CLOSURE       Family History   Problem Relation Age of Onset    Breast cancer Mother     Diabetes Neg Hx     Hypertension Neg Hx     Ovarian cancer Neg Hx     Miscarriages / Stillbirths Neg Hx      Social History     Socioeconomic History    Marital status:      Spouse name: Not on file    Number of children: Not on file    Years of education: Not on file    Highest education level: Not on file   Occupational History    Not on file   Social Needs    Financial resource strain: Not on file    Food insecurity     Worry: Not on file     Inability: Not on file    Transportation needs     Medical: Not on file     Non-medical: Not on file   Tobacco Use    Smoking status: Never Smoker   Substance and Sexual Activity    Alcohol use: No    Drug use: No    Sexual activity: Yes     Partners: Male   Lifestyle    Physical activity     Days per week: Not on file     Minutes per session: Not on file    Stress: Not on file   Relationships    Social connections     Talks on phone: Not on file     Gets together: Not on file     Attends Mandaeism service: Not on file     Active member of club or organization: Not on file     Attends meetings of clubs or organizations: Not on file     Relationship status: Not on file   Other Topics Concern    Not on file   Social History Narrative    Not on file       Review of patient's allergies indicates:  No Known Allergies  Review of Systems   Constitution: Negative for fever.   HENT: Negative for sore throat.    Eyes: Negative for blurred vision.   Cardiovascular: Negative for dyspnea on exertion.   Respiratory: Negative for shortness of breath.    Hematologic/Lymphatic: Does not bruise/bleed easily.   Skin: Negative for itching.   Musculoskeletal: Positive for joint pain, joint swelling, muscle cramps, muscle weakness and stiffness.    Gastrointestinal: Negative for vomiting.   Genitourinary: Negative for dysuria.   Neurological: Negative for dizziness and loss of balance.   Psychiatric/Behavioral: The patient does not have insomnia.        Physical Exam:   There is no height or weight on file to calculate BMI.  There were no vitals filed for this visit.   GENERAL: Well appearing, appropriate for stated age, no acute distress.  CARDIOVASCULAR: Pulses regular by peripheral palpation.  PULMONARY: Respirations are even and non-labored.  NEURO: Awake, alert, and oriented x 3.  PSYCH: Mood & affect are appropriate.  HEENT: Head is normocephalic and atraumatic.  General    Nursing note and vitals reviewed.          Right Knee Exam   Right knee exam is normal.    Range of Motion   Extension: 0   Flexion: 120     Tests   Ligament Examination Lachman: normal (-1 to 2mm) PCL-Posterior Drawer: normal (0 to 2mm)     MCL - Valgus: normal (0 to 2mm)  LCL - Varus: normal    Other   Sensation: normal    Left Knee Exam     Tenderness   The patient tender to palpation of the patellar tendon and medial joint line.    Crepitus   The patient has crepitus of the patella.    Range of Motion   Extension: 0   Flexion: 120     Tests   Stability Lachman: normal (-1 to 2mm) PCL-Posterior Drawer: normal (0 to 2mm)  MCL - Valgus: normal (0 to 2mm)  LCL - Varus: normal (0 to 2mm)  Patella   Patellar apprehension: trace    Other   Sensation: normal    Muscle Strength   Right Lower Extremity   Hip Abduction: 5/5   Quadriceps:  5/5   Hamstrin/5   Left Lower Extremity   Hip Abduction: 5/5   Quadriceps:  5/5   Hamstrin/5     Vascular Exam     Right Pulses  Dorsalis Pedis:      2+  Posterior Tibial:      2+        Left Pulses  Dorsalis Pedis:      2+  Posterior Tibial:      2+          Intact EHL, FHL, gastrocsoleus, and tibialis anterior.   Sensation intact to light touch in superficial peroneal, deep peroneal, tibial, sural, and saphenous nerve distributions.   Foot  warm and well perfused with capillary refill of less than 2 seconds and palpable pedal pulses.      Imaging:    X-Ray Tibia Fibula 2 View Left  Narrative: EXAMINATION:  XR TIBIA FIBULA 2 VIEW LEFT    CLINICAL HISTORY:  Pain in left knee    TECHNIQUE:  AP and lateral views of the left tibia and fibula were performed.    COMPARISON:  None.    FINDINGS:  No acute fractures or dislocations visualized.  There are multiple prominent varicosities seen in the medial soft tissues.  Impression: As above.    Electronically signed by: Yonis Penn MD  Date:    11/02/2020  Time:    13:31  X-ray Knee Ortho Left with Flexion  Narrative: EXAMINATION:  XR KNEE ORTHO LEFT WITH FLEXION    CLINICAL HISTORY:  . Pain in left knee    TECHNIQUE:  AP standing of both knees, PA flexion standing views of both knees, and Merchant views of both knees were performed. A lateral of the left knee was also performed.    COMPARISON:  None    FINDINGS:  There is no radiographic evidence of acute osseous, articular, or soft tissue abnormality.  Joint spaces are preserved.  Impression: No acute findings    Electronically signed by: Yonis Penn MD  Date:    11/02/2020  Time:    11:30    No new radiographic images obtained at todays visit. Previous images reviewed.  Relevant imaging results reviewed and interpreted by me, discussed with the patient and / or family today.     Other Tests:     No other tests performed today.  The patient at been in physical therapy and compliant with treatment. She has completed 8 sessions of physical therapy she still has pain located to the front of her knee and patella area. Most of her pain is related to stairs. At this time we will continue physical therapy. I will progress her work status to a medium duty with frequent sitting breaks. She will return in 8 weeks for repeat eval    Assessment:  Trang Saeed is a 40 y.o. female  presents today for a recheck on left knee pain after a work place  injury on 9/3/20.     Encounter Diagnoses   Name Primary?    Patellar tendinitis of left knee Yes    Patellar bursitis of left knee     Contusion of left patella, subsequent encounter     Work related injury         Plan:  · Continue physical therapy with [facility] working on quad, glute, and hip strengthen.  · Work restrictions: Medium duty with frequent sitting breaks.   · Knee sleeve during the day  · Voltaren gel prn- over the counter  · Recheck in 8 weeks   Treatment plan was developed with input from the patient/family, and they expressed understanding and agreement with the plan. All questions were answered today.    Follow-up: 8 weeks or sooner if there are any problems between now and then.    Disclaimer: This note was prepared using a voice recognition system and is likely to have sound alike errors within the text.

## 2020-12-28 NOTE — PROGRESS NOTES
Physical Therapy Treatment Note     Name: Trang Springeranza  Clinic Number: [id number]    Therapy Diagnosis:   Encounter Diagnosis   Name Primary?    Acute pain of left knee      Physician: Nicolas Zuleta MD    Visit Date: 12/29/2020    Physician Orders: PT Eval and Treat   Medical Diagnosis from Referral: Patellar tendinitis of left knee  Evaluation Date: 11/16/2020  Authorization Period Expiration: 11/02/2021  Plan of Care Expiration: 12/28/2020  Visit # / Visits authorized: 10/ 12    Time In: 4:00 pm   Time Out  4:55 pm   Total Billable Time: 45 minutes    Precautions: Standard    Subjective     Pt reports:  See in UPOC  She was compliant with home exercise program.  Response to previous treatment: good   Functional change: pain relief, improvement in walking    Pain: 1/10   Location: left knee      Objective     Trang received therapeutic exercises to develop strength, endurance, ROM and flexibility for 40 minutes including:    Nustep x 6 min for joint nutrition  Shuttle 6 bands x 3 min  Shuttl SL 4 bands x 2 min  Small step up with hip drive through  Standing glut med against TB  Hip AB/hip FL in sidelying  Prone glut lift with knee bent  TRX band squats  Heel raises 3 second holds    Trang received the following manual therapy techniques: Joint mobilizations and Soft tissue Mobilization were applied to the: L knee for 5 minutes, including:  Tibiofemoral distractions grades I-II   STM medial knee  Patellofemoral caudual mobilizations grades I-III    Trang received cold pack for 10 minutes to L knee.    Home Exercises Provided and Patient Education Provided     Education provided:   - POC, nature of symptoms    Written Home Exercises Provided: yes.  Exercises were reviewed and Trang was able to demonstrate them prior to the end of the session.  Trang demonstrated fair  understanding of the education provided.     See EMR under N/A for exercises provided prior visit.    Assessment   See in  CHANTE Costello is progressing well towards her goals.   Pt prognosis is Fair.     Pt will continue to benefit from skilled outpatient physical therapy to address the deficits listed in the problem list box on initial evaluation, provide pt/family education and to maximize pt's level of independence in the home and community environment.     Pt's spiritual, cultural and educational needs considered and pt agreeable to plan of care and goals.     Anticipated barriers to physical therapy: Language    Goals:    Short Term Goals: In 4 weeks:  1.I with HEP- met  2.Patient to demo increased AROM /PROM  to 90% WNLs  met  3.Patient to demo increase LE strength  to 4/5 grossly.  not met  4.Patient to have decreased pain to 5/10 at all times.  met     Long Term Goals: In 6 weeks  1. Patient to perform daily activities including Work duties without limitation. not met  2. Patient to demonstrate increased knee AROM/PROM to 100% WNLs. not met  3. Patient to demonstrate increased LE strength to 4+/5 grossly. not met  4. Patient to have decreased pain to 3/10. not met    Plan     Plan of care Certification:      Outpatient Physical Therapy 2 times weekly for 4 weeks to include the following interventions: Gait Training, Manual Therapy, Moist Heat/ Ice, Neuromuscular Re-ed, Patient Education, Therapeutic Activites and Therapeutic Exercise.      Upon discharge from further skilled PT intervention it will determined if the need for a work conditioning program or Functional Capacity Evaluation is required to allow the injured worker to return to work with full potential achieved, continued improvement with body mechanics with advanced functional activities, and further prevention of future work-related injuries.      Sangeetha Nuñez, PT

## 2020-12-29 ENCOUNTER — CLINICAL SUPPORT (OUTPATIENT)
Dept: REHABILITATION | Facility: HOSPITAL | Age: 40
End: 2020-12-29
Payer: COMMERCIAL

## 2020-12-29 DIAGNOSIS — M25.562 ACUTE PAIN OF LEFT KNEE: ICD-10-CM

## 2020-12-29 PROCEDURE — 97110 THERAPEUTIC EXERCISES: CPT

## 2020-12-29 NOTE — PLAN OF CARE
Outpatient Therapy Updated Plan of Care     Visit Date: 12/29/2020  Name: Trang Saeed  Clinic Number: [id number]    Therapy Diagnosis:   Encounter Diagnosis   Name Primary?    Acute pain of left knee      Physician: Nicolas Zuleta MD    Physician Orders: PT Eval and Treat   Medical Diagnosis from Referral: Patellar tendinitis of left knee  Evaluation Date: 11/16/2020    Total Visits Received: 10  Cancelled Visits: 0  No Show Visits: 0    Current Certification Period:  12/29/20 to 1/29/21  Precautions:  Standard  Visits from Evaluation Date:  10  Functional Level Prior to Evaluation:  IND    Subjective     Update: Patient reports that she is getting better each week, but is still having difficulty standing for long periods of time as well as with stairs.    Objective     Update: normal     Squat: Double leg: able to get to 90 degrees with UE support- slight pain while  performing              Single leg: DNT     Balance: eyes open SL balance on R: 10 seconds on L: 5 seconds.      Reflex/Sensation: Intact     Knee AROM:                                                  (L)        (R)                                      Flexion                         WNL      WNL                                      Extension                     WNL          WNL  Knee PROM              Flexion                         WNL      WNL                                      Extension                    WNL    WNL            Strength:                    Hip flexors                   4/5      4/5  Quadriceps                  4/5   4/5                                             Hamstrings                  4/5     4/5                                      Anterior Tibialis           4/5       4/5                                      Peroneals                    4/5       4/5                                      Gastrocnemius            4/5     4/5                                      Adductors                    4/5        4+/5                                      External rotators         4/5     4/5                                      Gluteus Medius           4/5     3+/5                                      Gluteus Pb        4/5     4-/5     Joint Mobility: normal     Muscle Length:          Hamstrings: normal length                                      Bart: limited hip flexor length L>R    Tenderness to palpation:  tender to palpation along joint line of L knee     Special Test:              Anterior Drawer           neg         Posterior Drawer         neg                                      Lachman                     neg                                       Valgus stress              Painful Varus stress                Negative                                      Sherry                    discomfort                                                                        Lower Limb Tension Test:  negative     Functional Job Specific Testing:      Job Specific Task Job Demands Current Ability Deficit? (Yes or No)   1. Moving items Climbing stairs Mild difficulty  Yes   2. Sweeping Standing endurance Mild to moderate difficulty Yes   3. Carrying boxes Lifting and carrying up to 20# items Moderate difficulty Yes   (Testing should not be performed beyond that of the patient's job demands.)    Assessment     Update: Patient has demonstrated great improvement in ROM, increased strength, decreased pain/adverse symptoms, improvement in tolerance to activities, and improvement in SL activities since starting skilled therapy.    CMS Impairment/Limitation/Restriction for FOTO Knee Survey    Therapist reviewed FOTO scores for Trang Saeed on 12/29/2020.   FOTO documents entered into [facility] - see Media section.    Limitation Score: 43%       Short Term Goals: In 4 weeks:  1.I with HEP- met  2.Patient to demo increased AROM /PROM  to 90% WNLs  met  3.Patient to demo increase LE strength  to 4/5 grossly.  not  met  4.Patient to have decreased pain to 5/10 at all times.  met     Long Term Goals: In 6 weeks  1. Patient to perform daily activities including Work duties without limitation. not met  2. Patient to demonstrate increased knee AROM/PROM to 100% WNLs. not met  3. Patient to demonstrate increased LE strength to 4+/5 grossly. not met  4. Patient to have decreased pain to 3/10. not met    Reasons for Recertification of Therapy:   Patient is still having difficulty with increased WBing to L LE, pain with longer distance walking, pain with more endurance related activities, and pain with stairs. Patient needs continued skilled therapy in order to improve strength, pain/adverse symptoms, motor control, tolerance to activities, and work specific demands in order to improve quality of life and return to work.    Plan     Updated Certification Period: 12/29/2020 to 1/29/21  Recommended Treatment Plan: 2 times per week for 4 weeks: Manual Therapy, Moist Heat/ Ice, Neuromuscular Re-ed, Patient Education, Therapeutic Activites and Therapeutic Exercise  Other Recommendations: CASSY Nuñez, PT  12/29/2020      I CERTIFY THE NEED FOR THESE SERVICES FURNISHED UNDER THIS PLAN OF TREATMENT AND WHILE UNDER MY CARE    Physician's comments:        Physician's Signature: ___________________________________________________

## 2020-12-30 NOTE — PROGRESS NOTES
Physical Therapy Treatment Note     Name: Trang Saeed  Clinic Number: [id number]    Therapy Diagnosis:   Encounter Diagnoses   Name Primary?    Patellar tendinitis of left knee     Patellar bursitis of left knee     Contusion of left patella, subsequent encounter     Acute pain of left knee      Physician: Nicolas Zuleta MD    Visit Date: 12/31/2020    Physician Orders: PT Eval and Treat   Medical Diagnosis from Referral: Patellar tendinitis of left knee  Evaluation Date: 11/16/2020  Authorization Period Expiration: 11/02/2021  Plan of Care Expiration: 1/29/2021  Visit # / Visits authorized: 11/ 12    Time In: 2:30 pm   Time Out  3:25 pm   Total Billable Time: 45 minutes    Precautions: Standard    Subjective     Pt reports:  That she is feeling better and less pain with stairs and standing since last session.  She was compliant with home exercise program.  Response to previous treatment: good   Functional change: pain relief, improvement in walking    Pain: 1/10   Location: left knee      Objective     Trang received therapeutic exercises to develop strength, endurance, ROM and flexibility for 40 minutes including:    LE bike x 5 min for joint nutrition  Shuttle 6 bands x 3 min  Shuttl SL 4 bands x 2 min  Stairs x 5 with alternating pattern  Standing glut med against TB  Hip AB/hip FL in sidelying  Prone glut lift with knee bent  TRX band squats  Heel raises 3 second holds  Hamstring machine 35#     Trang received the following manual therapy techniques: Joint mobilizations and Soft tissue Mobilization were applied to the: L knee for 5 minutes, including:  Tibiofemoral distractions grades I-II   STM medial knee  Patellofemoral caudual mobilizations grades I-III    Trang received cold pack for 10 minutes to L knee.    Home Exercises Provided and Patient Education Provided     Education provided:   - POC, nature of symptoms    Written Home Exercises Provided: yes.  Exercises were reviewed  and Trang was able to demonstrate them prior to the end of the session.  Trang demonstrated fair  understanding of the education provided.     See EMR under N/A for exercises provided prior visit.    Assessment   Patient demonstrated better tolerance to more WBing activities this session with improvement in symptoms by the end of the session. Also noted patient is now able to perform reciprocal walking pattern on stairs with no arm support with minimal increase in pain.    Trang is progressing well towards her goals.   Pt prognosis is Fair.     Pt will continue to benefit from skilled outpatient physical therapy to address the deficits listed in the problem list box on initial evaluation, provide pt/family education and to maximize pt's level of independence in the home and community environment.     Pt's spiritual, cultural and educational needs considered and pt agreeable to plan of care and goals.     Anticipated barriers to physical therapy: Language    Goals:    Short Term Goals: In 4 weeks:  1.I with HEP- met  2.Patient to demo increased AROM /PROM  to 90% WNLs  met  3.Patient to demo increase LE strength  to 4/5 grossly.  not met  4.Patient to have decreased pain to 5/10 at all times.  met     Long Term Goals: In 6 weeks  1. Patient to perform daily activities including Work duties without limitation. not met  2. Patient to demonstrate increased knee AROM/PROM to 100% WNLs. not met  3. Patient to demonstrate increased LE strength to 4+/5 grossly. not met  4. Patient to have decreased pain to 3/10. not met    Plan     Plan of care Certification: 12/29/21 to 1/29/21     Outpatient Physical Therapy 2 times weekly for 4 weeks to include the following interventions: Gait Training, Manual Therapy, Moist Heat/ Ice, Neuromuscular Re-ed, Patient Education, Therapeutic Activites and Therapeutic Exercise.      Upon discharge from further skilled PT intervention it will determined if the need for a work conditioning  program or Functional Capacity Evaluation is required to allow the injured worker to return to work with full potential achieved, continued improvement with body mechanics with advanced functional activities, and further prevention of future work-related injuries.      Sangeetha Nuñez, PT

## 2020-12-31 ENCOUNTER — CLINICAL SUPPORT (OUTPATIENT)
Dept: REHABILITATION | Facility: HOSPITAL | Age: 40
End: 2020-12-31
Payer: COMMERCIAL

## 2020-12-31 DIAGNOSIS — M70.52 PATELLAR BURSITIS OF LEFT KNEE: ICD-10-CM

## 2020-12-31 DIAGNOSIS — S80.02XD CONTUSION OF LEFT PATELLA, SUBSEQUENT ENCOUNTER: ICD-10-CM

## 2020-12-31 DIAGNOSIS — M76.52 PATELLAR TENDINITIS OF LEFT KNEE: ICD-10-CM

## 2020-12-31 DIAGNOSIS — M25.562 ACUTE PAIN OF LEFT KNEE: ICD-10-CM

## 2020-12-31 PROCEDURE — 97140 MANUAL THERAPY 1/> REGIONS: CPT

## 2020-12-31 PROCEDURE — 97110 THERAPEUTIC EXERCISES: CPT

## 2021-01-05 ENCOUNTER — CLINICAL SUPPORT (OUTPATIENT)
Dept: REHABILITATION | Facility: HOSPITAL | Age: 41
End: 2021-01-05
Payer: COMMERCIAL

## 2021-01-05 DIAGNOSIS — M25.562 ACUTE PAIN OF LEFT KNEE: ICD-10-CM

## 2021-01-05 PROCEDURE — 97110 THERAPEUTIC EXERCISES: CPT

## 2021-01-05 PROCEDURE — 97140 MANUAL THERAPY 1/> REGIONS: CPT

## 2021-01-07 ENCOUNTER — CLINICAL SUPPORT (OUTPATIENT)
Dept: REHABILITATION | Facility: HOSPITAL | Age: 41
End: 2021-01-07
Payer: COMMERCIAL

## 2021-01-07 DIAGNOSIS — M25.562 ACUTE PAIN OF LEFT KNEE: ICD-10-CM

## 2021-01-07 PROCEDURE — 97110 THERAPEUTIC EXERCISES: CPT

## 2021-01-07 PROCEDURE — 97140 MANUAL THERAPY 1/> REGIONS: CPT

## 2021-01-12 ENCOUNTER — CLINICAL SUPPORT (OUTPATIENT)
Dept: REHABILITATION | Facility: HOSPITAL | Age: 41
End: 2021-01-12
Payer: COMMERCIAL

## 2021-01-12 DIAGNOSIS — M25.562 ACUTE PAIN OF LEFT KNEE: ICD-10-CM

## 2021-01-12 PROCEDURE — 97110 THERAPEUTIC EXERCISES: CPT

## 2021-01-14 ENCOUNTER — DOCUMENTATION ONLY (OUTPATIENT)
Dept: REHABILITATION | Facility: HOSPITAL | Age: 41
End: 2021-01-14

## 2021-01-14 ENCOUNTER — CLINICAL SUPPORT (OUTPATIENT)
Dept: REHABILITATION | Facility: HOSPITAL | Age: 41
End: 2021-01-14
Payer: COMMERCIAL

## 2021-01-14 DIAGNOSIS — M25.562 ACUTE PAIN OF LEFT KNEE: ICD-10-CM

## 2021-01-14 PROCEDURE — 97110 THERAPEUTIC EXERCISES: CPT | Mod: CQ

## 2021-01-19 ENCOUNTER — CLINICAL SUPPORT (OUTPATIENT)
Dept: REHABILITATION | Facility: HOSPITAL | Age: 41
End: 2021-01-19
Payer: COMMERCIAL

## 2021-01-19 DIAGNOSIS — M25.562 ACUTE PAIN OF LEFT KNEE: ICD-10-CM

## 2021-01-19 PROCEDURE — 97140 MANUAL THERAPY 1/> REGIONS: CPT

## 2021-01-19 PROCEDURE — 97110 THERAPEUTIC EXERCISES: CPT

## 2021-01-21 ENCOUNTER — CLINICAL SUPPORT (OUTPATIENT)
Dept: REHABILITATION | Facility: HOSPITAL | Age: 41
End: 2021-01-21
Payer: COMMERCIAL

## 2021-01-21 DIAGNOSIS — M25.562 ACUTE PAIN OF LEFT KNEE: ICD-10-CM

## 2021-01-21 PROCEDURE — 97110 THERAPEUTIC EXERCISES: CPT | Mod: CQ

## 2021-01-28 ENCOUNTER — CLINICAL SUPPORT (OUTPATIENT)
Dept: REHABILITATION | Facility: HOSPITAL | Age: 41
End: 2021-01-28
Payer: COMMERCIAL

## 2021-01-28 DIAGNOSIS — M25.562 ACUTE PAIN OF LEFT KNEE: ICD-10-CM

## 2021-01-28 PROCEDURE — 97110 THERAPEUTIC EXERCISES: CPT

## 2021-01-28 PROCEDURE — 97140 MANUAL THERAPY 1/> REGIONS: CPT

## 2021-02-02 ENCOUNTER — CLINICAL SUPPORT (OUTPATIENT)
Dept: REHABILITATION | Facility: HOSPITAL | Age: 41
End: 2021-02-02
Payer: COMMERCIAL

## 2021-02-02 DIAGNOSIS — M25.562 ACUTE PAIN OF LEFT KNEE: ICD-10-CM

## 2021-02-02 PROCEDURE — 97140 MANUAL THERAPY 1/> REGIONS: CPT

## 2021-02-02 PROCEDURE — 97110 THERAPEUTIC EXERCISES: CPT

## 2021-02-05 ENCOUNTER — TELEPHONE (OUTPATIENT)
Dept: ORTHOPEDICS | Facility: CLINIC | Age: 41
End: 2021-02-05

## 2021-02-05 ENCOUNTER — CLINICAL SUPPORT (OUTPATIENT)
Dept: REHABILITATION | Facility: HOSPITAL | Age: 41
End: 2021-02-05
Payer: COMMERCIAL

## 2021-02-05 DIAGNOSIS — M25.562 ACUTE PAIN OF LEFT KNEE: ICD-10-CM

## 2021-02-05 PROCEDURE — 97110 THERAPEUTIC EXERCISES: CPT

## 2021-02-05 PROCEDURE — 97140 MANUAL THERAPY 1/> REGIONS: CPT

## 2021-02-12 ENCOUNTER — CLINICAL SUPPORT (OUTPATIENT)
Dept: REHABILITATION | Facility: HOSPITAL | Age: 41
End: 2021-02-12
Payer: COMMERCIAL

## 2021-02-12 DIAGNOSIS — M25.562 ACUTE PAIN OF LEFT KNEE: ICD-10-CM

## 2021-02-12 PROCEDURE — 97110 THERAPEUTIC EXERCISES: CPT

## 2021-02-18 ENCOUNTER — CLINICAL SUPPORT (OUTPATIENT)
Dept: REHABILITATION | Facility: HOSPITAL | Age: 41
End: 2021-02-18
Payer: COMMERCIAL

## 2021-02-18 DIAGNOSIS — M25.562 ACUTE PAIN OF LEFT KNEE: ICD-10-CM

## 2021-02-18 PROCEDURE — 97110 THERAPEUTIC EXERCISES: CPT

## 2021-02-23 ENCOUNTER — CLINICAL SUPPORT (OUTPATIENT)
Dept: REHABILITATION | Facility: HOSPITAL | Age: 41
End: 2021-02-23
Payer: COMMERCIAL

## 2021-02-23 DIAGNOSIS — M25.562 ACUTE PAIN OF LEFT KNEE: ICD-10-CM

## 2021-02-23 PROCEDURE — 97110 THERAPEUTIC EXERCISES: CPT

## 2021-02-25 ENCOUNTER — CLINICAL SUPPORT (OUTPATIENT)
Dept: REHABILITATION | Facility: HOSPITAL | Age: 41
End: 2021-02-25
Payer: COMMERCIAL

## 2021-02-25 DIAGNOSIS — M25.562 ACUTE PAIN OF LEFT KNEE: ICD-10-CM

## 2021-02-25 PROCEDURE — 97110 THERAPEUTIC EXERCISES: CPT

## 2021-03-01 ENCOUNTER — CLINICAL SUPPORT (OUTPATIENT)
Dept: REHABILITATION | Facility: HOSPITAL | Age: 41
End: 2021-03-01
Payer: COMMERCIAL

## 2021-03-01 DIAGNOSIS — M25.562 ACUTE PAIN OF LEFT KNEE: ICD-10-CM

## 2021-03-01 PROCEDURE — 97110 THERAPEUTIC EXERCISES: CPT

## 2021-03-04 ENCOUNTER — CLINICAL SUPPORT (OUTPATIENT)
Dept: REHABILITATION | Facility: HOSPITAL | Age: 41
End: 2021-03-04
Payer: COMMERCIAL

## 2021-03-04 DIAGNOSIS — M25.562 ACUTE PAIN OF LEFT KNEE: ICD-10-CM

## 2021-03-04 PROCEDURE — 97110 THERAPEUTIC EXERCISES: CPT

## 2021-03-08 ENCOUNTER — OFFICE VISIT (OUTPATIENT)
Dept: ORTHOPEDICS | Facility: CLINIC | Age: 41
End: 2021-03-08
Payer: COMMERCIAL

## 2021-03-08 DIAGNOSIS — S80.02XD CONTUSION OF LEFT PATELLA, SUBSEQUENT ENCOUNTER: ICD-10-CM
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
## 2021-03-08 DIAGNOSIS — M70.52 PATELLAR BURSITIS OF LEFT KNEE: ICD-10-CM

## 2021-03-08 DIAGNOSIS — M76.52 PATELLAR TENDINITIS OF LEFT KNEE: Primary | ICD-10-CM

## 2021-03-08 PROCEDURE — 99213 PR OFFICE/OUTPT VISIT, EST, LEVL III, 20-29 MIN: ICD-10-PCS | Mod: S$GLB,,, | Performed by: PHYSICIAN ASSISTANT

## 2021-03-08 PROCEDURE — 99999 PR PBB SHADOW E&M-EST. PATIENT-LVL III: ICD-10-PCS | Mod: PBBFAC,,, | Performed by: PHYSICIAN ASSISTANT

## 2021-03-08 PROCEDURE — 99999 PR PBB SHADOW E&M-EST. PATIENT-LVL III: CPT | Mod: PBBFAC,,, | Performed by: PHYSICIAN ASSISTANT

## 2021-03-08 PROCEDURE — 99213 OFFICE O/P EST LOW 20 MIN: CPT | Mod: S$GLB,,, | Performed by: PHYSICIAN ASSISTANT

## 2021-03-10 ENCOUNTER — CLINICAL SUPPORT (OUTPATIENT)
Dept: REHABILITATION | Facility: HOSPITAL | Age: 41
End: 2021-03-10
Payer: COMMERCIAL

## 2021-03-10 DIAGNOSIS — M25.562 ACUTE PAIN OF LEFT KNEE: ICD-10-CM

## 2021-03-10 PROCEDURE — 97110 THERAPEUTIC EXERCISES: CPT

## 2021-03-10 PROCEDURE — 97140 MANUAL THERAPY 1/> REGIONS: CPT

## 2021-03-16 ENCOUNTER — CLINICAL SUPPORT (OUTPATIENT)
Dept: REHABILITATION | Facility: HOSPITAL | Age: 41
End: 2021-03-16
Payer: COMMERCIAL

## 2021-03-16 DIAGNOSIS — M25.562 ACUTE PAIN OF LEFT KNEE: ICD-10-CM

## 2021-03-16 PROCEDURE — 97110 THERAPEUTIC EXERCISES: CPT

## 2021-03-18 ENCOUNTER — TELEPHONE (OUTPATIENT)
Dept: ORTHOPEDICS | Facility: CLINIC | Age: 41
End: 2021-03-18

## 2021-03-18 ENCOUNTER — CLINICAL SUPPORT (OUTPATIENT)
Dept: REHABILITATION | Facility: HOSPITAL | Age: 41
End: 2021-03-18
Payer: COMMERCIAL

## 2021-03-18 DIAGNOSIS — M25.562 ACUTE PAIN OF LEFT KNEE: ICD-10-CM

## 2021-03-18 PROCEDURE — 97110 THERAPEUTIC EXERCISES: CPT | Mod: CQ

## 2021-03-19 ENCOUNTER — HOSPITAL ENCOUNTER (OUTPATIENT)
Dept: RADIOLOGY | Facility: HOSPITAL | Age: 41
Discharge: HOME | End: 2021-03-19
Attending: PHYSICIAN ASSISTANT
Payer: COMMERCIAL

## 2021-03-19 DIAGNOSIS — M76.52 PATELLAR TENDINITIS OF LEFT KNEE: ICD-10-CM

## 2021-03-19 DIAGNOSIS — S80.02XD CONTUSION OF LEFT PATELLA, SUBSEQUENT ENCOUNTER: ICD-10-CM

## 2021-03-19 DIAGNOSIS — M70.52 PATELLAR BURSITIS OF LEFT KNEE: ICD-10-CM
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
## 2021-03-19 PROCEDURE — 73721 MRI JNT OF LWR EXTRE W/O DYE: CPT | Mod: TC,LT

## 2021-03-19 PROCEDURE — 73721 MRI KNEE WITHOUT CONTRAST LEFT: ICD-10-PCS | Mod: 26,LT,, | Performed by: RADIOLOGY

## 2021-03-19 PROCEDURE — 73721 MRI JNT OF LWR EXTRE W/O DYE: CPT | Mod: 26,LT,, | Performed by: RADIOLOGY

## 2021-03-23 ENCOUNTER — CLINICAL SUPPORT (OUTPATIENT)
Dept: REHABILITATION | Facility: HOSPITAL | Age: 41
End: 2021-03-23
Payer: COMMERCIAL

## 2021-03-23 ENCOUNTER — TELEPHONE (OUTPATIENT)
Dept: ORTHOPEDICS | Facility: CLINIC | Age: 41
End: 2021-03-23

## 2021-03-23 DIAGNOSIS — M25.562 ACUTE PAIN OF LEFT KNEE: ICD-10-CM

## 2021-03-23 PROCEDURE — 97110 THERAPEUTIC EXERCISES: CPT

## 2021-03-25 ENCOUNTER — CLINICAL SUPPORT (OUTPATIENT)
Dept: REHABILITATION | Facility: HOSPITAL | Age: 41
End: 2021-03-25
Payer: COMMERCIAL

## 2021-03-25 DIAGNOSIS — M25.562 ACUTE PAIN OF LEFT KNEE: ICD-10-CM

## 2021-03-25 PROCEDURE — 97110 THERAPEUTIC EXERCISES: CPT

## 2021-03-26 ENCOUNTER — OFFICE VISIT (OUTPATIENT)
Dept: ORTHOPEDICS | Facility: CLINIC | Age: 41
End: 2021-03-26
Payer: COMMERCIAL

## 2021-03-26 ENCOUNTER — TELEPHONE (OUTPATIENT)
Dept: ORTHOPEDICS | Facility: CLINIC | Age: 41
End: 2021-03-26

## 2021-03-26 DIAGNOSIS — M76.52 PATELLAR TENDINITIS OF LEFT KNEE: Primary | ICD-10-CM

## 2021-03-26 DIAGNOSIS — M70.52 PATELLAR BURSITIS OF LEFT KNEE: ICD-10-CM
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
## 2021-03-26 PROCEDURE — 99213 OFFICE O/P EST LOW 20 MIN: CPT | Mod: S$GLB,,, | Performed by: ORTHOPAEDIC SURGERY

## 2021-03-26 PROCEDURE — 99999 PR PBB SHADOW E&M-EST. PATIENT-LVL II: ICD-10-PCS | Mod: PBBFAC,,, | Performed by: ORTHOPAEDIC SURGERY

## 2021-03-26 PROCEDURE — 99999 PR PBB SHADOW E&M-EST. PATIENT-LVL II: CPT | Mod: PBBFAC,,, | Performed by: ORTHOPAEDIC SURGERY

## 2021-03-26 PROCEDURE — 99213 PR OFFICE/OUTPT VISIT, EST, LEVL III, 20-29 MIN: ICD-10-PCS | Mod: S$GLB,,, | Performed by: ORTHOPAEDIC SURGERY

## 2021-04-13 ENCOUNTER — TELEPHONE (OUTPATIENT)
Dept: ORTHOPEDICS | Facility: CLINIC | Age: 41
End: 2021-04-13

## 2021-04-21 ENCOUNTER — TELEPHONE (OUTPATIENT)
Dept: ORTHOPEDICS | Facility: CLINIC | Age: 41
End: 2021-04-21

## 2021-04-22 ENCOUNTER — OFFICE VISIT (OUTPATIENT)
Dept: ORTHOPEDICS | Facility: CLINIC | Age: 41
End: 2021-04-22
Payer: COMMERCIAL

## 2021-04-22 ENCOUNTER — TELEPHONE (OUTPATIENT)
Dept: ORTHOPEDICS | Facility: CLINIC | Age: 41
End: 2021-04-22

## 2021-04-22 DIAGNOSIS — S80.02XD CONTUSION OF LEFT PATELLA, SUBSEQUENT ENCOUNTER: ICD-10-CM

## 2021-04-22 DIAGNOSIS — M76.52 PATELLAR TENDINITIS OF LEFT KNEE: Primary | ICD-10-CM

## 2021-04-22 PROCEDURE — 99213 PR OFFICE/OUTPT VISIT, EST, LEVL III, 20-29 MIN: ICD-10-PCS | Mod: S$GLB,,, | Performed by: ORTHOPAEDIC SURGERY

## 2021-04-22 PROCEDURE — 99213 OFFICE O/P EST LOW 20 MIN: CPT | Mod: S$GLB,,, | Performed by: ORTHOPAEDIC SURGERY

## 2021-04-22 PROCEDURE — 99999 PR PBB SHADOW E&M-EST. PATIENT-LVL II: ICD-10-PCS | Mod: PBBFAC,,, | Performed by: ORTHOPAEDIC SURGERY

## 2021-04-22 PROCEDURE — 99999 PR PBB SHADOW E&M-EST. PATIENT-LVL II: CPT | Mod: PBBFAC,,, | Performed by: ORTHOPAEDIC SURGERY

## 2021-04-23 ENCOUNTER — TELEPHONE (OUTPATIENT)
Dept: ORTHOPEDICS | Facility: CLINIC | Age: 41
End: 2021-04-23

## 2021-05-12 ENCOUNTER — OFFICE VISIT (OUTPATIENT)
Dept: ORTHOPEDICS | Facility: CLINIC | Age: 41
End: 2021-05-12
Payer: COMMERCIAL

## 2021-05-12 VITALS — RESPIRATION RATE: 20 BRPM | WEIGHT: 209 LBS

## 2021-05-12 DIAGNOSIS — S80.02XD CONTUSION OF LEFT PATELLA, SUBSEQUENT ENCOUNTER: ICD-10-CM

## 2021-05-12 DIAGNOSIS — M76.52 PATELLAR TENDINITIS OF LEFT KNEE: ICD-10-CM

## 2021-05-12 PROCEDURE — 99999 PR PBB SHADOW E&M-EST. PATIENT-LVL III: CPT | Mod: PBBFAC,,, | Performed by: STUDENT IN AN ORGANIZED HEALTH CARE EDUCATION/TRAINING PROGRAM

## 2021-05-12 PROCEDURE — 99243 OFF/OP CNSLTJ NEW/EST LOW 30: CPT | Mod: S$GLB,,, | Performed by: STUDENT IN AN ORGANIZED HEALTH CARE EDUCATION/TRAINING PROGRAM

## 2021-05-12 PROCEDURE — 99999 PR PBB SHADOW E&M-EST. PATIENT-LVL III: ICD-10-PCS | Mod: PBBFAC,,, | Performed by: STUDENT IN AN ORGANIZED HEALTH CARE EDUCATION/TRAINING PROGRAM

## 2021-05-12 PROCEDURE — 99243 PR OFFICE CONSULTATION,LEVEL III: ICD-10-PCS | Mod: S$GLB,,, | Performed by: STUDENT IN AN ORGANIZED HEALTH CARE EDUCATION/TRAINING PROGRAM

## 2021-05-12 RX ORDER — PNV,CALCIUM 72/IRON/FOLIC ACID 27 MG-1 MG
1 TABLET ORAL DAILY
COMMUNITY
Start: 2021-04-22